# Patient Record
Sex: MALE | Race: WHITE | RURAL
[De-identification: names, ages, dates, MRNs, and addresses within clinical notes are randomized per-mention and may not be internally consistent; named-entity substitution may affect disease eponyms.]

---

## 2017-03-14 RX ORDER — AMLODIPINE AND BENAZEPRIL HYDROCHLORIDE 10; 20 MG/1; MG/1
1 CAPSULE ORAL DAILY
Qty: 90 CAP | Refills: 0 | Status: CANCELLED | OUTPATIENT
Start: 2017-03-14

## 2017-03-14 RX ORDER — SIMVASTATIN 80 MG/1
80 TABLET, FILM COATED ORAL
Qty: 90 TAB | Refills: 3 | Status: CANCELLED | OUTPATIENT
Start: 2017-03-14

## 2017-03-14 NOTE — TELEPHONE ENCOUNTER
Requested Prescriptions     Pending Prescriptions Disp Refills    amLODIPine-benazepril (LOTREL) 10-20 mg per capsule 90 Cap 0     Sig: Take 1 Cap by mouth daily. Appointment needed to refill this medication again.  simvastatin (ZOCOR) 80 mg tablet 90 Tab 3     Sig: Take 1 Tab by mouth nightly.

## 2017-03-15 ENCOUNTER — OFFICE VISIT (OUTPATIENT)
Dept: INTERNAL MEDICINE CLINIC | Age: 67
End: 2017-03-15

## 2017-03-15 VITALS
OXYGEN SATURATION: 95 % | SYSTOLIC BLOOD PRESSURE: 168 MMHG | RESPIRATION RATE: 16 BRPM | WEIGHT: 250 LBS | TEMPERATURE: 97.1 F | HEIGHT: 68 IN | DIASTOLIC BLOOD PRESSURE: 73 MMHG | BODY MASS INDEX: 37.89 KG/M2 | HEART RATE: 69 BPM

## 2017-03-15 DIAGNOSIS — I10 ESSENTIAL HYPERTENSION: Primary | ICD-10-CM

## 2017-03-15 DIAGNOSIS — E08.9 DIABETES MELLITUS DUE TO UNDERLYING CONDITION WITHOUT COMPLICATION, WITHOUT LONG-TERM CURRENT USE OF INSULIN (HCC): ICD-10-CM

## 2017-03-15 DIAGNOSIS — E78.5 HYPERLIPIDEMIA, UNSPECIFIED HYPERLIPIDEMIA TYPE: ICD-10-CM

## 2017-03-15 RX ORDER — AMLODIPINE AND BENAZEPRIL HYDROCHLORIDE 10; 20 MG/1; MG/1
1 CAPSULE ORAL DAILY
Qty: 90 CAP | Refills: 0 | Status: SHIPPED | OUTPATIENT
Start: 2017-03-15

## 2017-03-15 RX ORDER — SIMVASTATIN 80 MG/1
80 TABLET, FILM COATED ORAL
Qty: 90 TAB | Refills: 0 | Status: SHIPPED | OUTPATIENT
Start: 2017-03-15

## 2017-03-15 NOTE — PATIENT INSTRUCTIONS

## 2017-03-15 NOTE — MR AVS SNAPSHOT
Visit Information Date & Time Provider Department Dept. Phone Encounter #  
 3/15/2017 10:30 AM Germania Juan NP [facility] [id number] Your Appointments 4/4/2017 10:15 AM  
ROUTINE CARE with MD Mikhail Gtz ([facility]) Appt Note: medication refill 3/15/17 [address] [facility] [address]  
[phone]  
  
   
 [address] Upcoming Health Maintenance Date Due Hepatitis C Screening 1950 MICROALBUMIN Q1 10/13/1962 EYE EXAM RETINAL OR DILATED Q1 10/13/1962 COLONOSCOPY 10/13/1970 Pneumococcal 19-64 Medium Risk (1 of 1 - PPSV23) 10/13/1971 DTaP/Tdap/Td series (1 - Tdap) 10/13/1973 ZOSTER VACCINE AGE 60> 10/13/2012 HEMOGLOBIN A1C Q6M 3/29/2016 LIPID PANEL Q1 9/29/2016 FOOT EXAM Q1 11/10/2016 Allergies as of 3/15/2017  Review Complete On: 3/15/2017 By: Liberty Cherry LPN No Known Allergies Current Immunizations  Reviewed on 10/9/2012 No immunizations on file. Not reviewed this visit You Were Diagnosed With   
  
 Codes Comments Essential hypertension    -  Primary ICD-10-CM: I10 
ICD-9-CM: 401.9 Hyperlipidemia, unspecified hyperlipidemia type     ICD-10-CM: E78.5 ICD-9-CM: 272.4 Diabetes mellitus due to underlying condition without complication, without long-term current use of insulin (HCC)     ICD-10-CM: E08.9 ICD-9-CM: 249.00 Vitals BP Pulse Temp Resp Height(growth percentile) 168/73 (BP 1 Location: Left arm, BP Patient Position: Sitting) 69 97.1 °F (36.2 °C) (Temporal) 16 5' 8\" (1.727 m) Weight(growth percentile) SpO2 BMI Smoking Status 250 lb (113.4 kg) 95% 38.01 kg/m2 Current Every Day Smoker BMI and BSA Data Body Mass Index Body Surface Area 38.01 kg/m 2 2.33 m 2 Preferred Pharmacy Pharmacy Name Phone 150 [facility], [address] [place] [address] Drive [address] [place] [address] [phone] Your Updated Medication List  
  
   
This list is accurate as of: 3/15/17 11:13 AM.  Always use your most recent med list. amLODIPine-benazepril 10-20 mg per capsule Commonly known as:  Shaun Holster Take 1 Cap by mouth daily. Appointment needed to refill this medication again. metoprolol tartrate 50 mg tablet Commonly known as:  LOPRESSOR Take 1 Tab by mouth two (2) times a day. simvastatin 80 mg tablet Commonly known as:  ZOCOR Take 1 Tab by mouth nightly. Prescriptions Sent to Pharmacy Refills  
 simvastatin (ZOCOR) 80 mg tablet 0 Sig: Take 1 Tab by mouth nightly. Class: Normal  
 Pharmacy: [address] [place] [address], [address] [place] [address] Ph #: [phone] Route: Oral  
 amLODIPine-benazepril (LOTREL) 10-20 mg per capsule 0 Sig: Take 1 Cap by mouth daily. Appointment needed to refill this medication again. Class: Normal  
 Pharmacy: [address] [place] [address] Ph #: [phone] Route: Oral  
  
To-Do List   
 03/15/2017 Lab:  HEMOGLOBIN A1C WITH EAG   
  
 03/15/2017 Lab:  LIPID PANEL   
  
 03/15/2017 Lab:  METABOLIC PANEL, COMPREHENSIVE Patient Instructions High Blood Pressure: Care Instructions Your Care Instructions If your blood pressure is usually above 140/90, you have high blood pressure, or hypertension. That means the top number is 140 or higher or the bottom number is 90 or higher, or both. Despite what a lot of people think, high blood pressure usually doesn't cause headaches or make you feel dizzy or lightheaded. It usually has no symptoms. But it does increase your risk for heart attack, stroke, and kidney or eye damage. The higher your blood pressure, the more your risk increases. Your doctor will give you a goal for your blood pressure.  Your goal will be based on your health and your age. An example of a goal is to keep your blood pressure below 140/90. Lifestyle changes, such as eating healthy and being active, are always important to help lower blood pressure. You might also take medicine to reach your blood pressure goal. 
Follow-up care is a key part of your treatment and safety. Be sure to make and go to all appointments, and call your doctor if you are having problems. It's also a good idea to know your test results and keep a list of the medicines you take. How can you care for yourself at home? Medical treatment · If you stop taking your medicine, your blood pressure will go back up. You may take one or more types of medicine to lower your blood pressure. Be safe with medicines. Take your medicine exactly as prescribed. Call your doctor if you think you are having a problem with your medicine. · Talk to your doctor before you start taking aspirin every day. Aspirin can help certain people lower their risk of a heart attack or stroke. But taking aspirin isn't right for everyone, because it can cause serious bleeding. · See your doctor regularly. You may need to see the doctor more often at first or until your blood pressure comes down. · If you are taking blood pressure medicine, talk to your doctor before you take decongestants or anti-inflammatory medicine, such as ibuprofen. Some of these medicines can raise blood pressure. · Learn how to check your blood pressure at home. Lifestyle changes · Stay at a healthy weight. This is especially important if you put on weight around the waist. Losing even 10 pounds can help you lower your blood pressure. · If your doctor recommends it, get more exercise. Walking is a good choice. Bit by bit, increase the amount you walk every day. Try for at least 30 minutes on most days of the week. You also may want to swim, bike, or do other activities. · Avoid or limit alcohol. Talk to your doctor about whether you can drink any alcohol. · Try to limit how much sodium you eat to less than 2,300 milligrams (mg) a day. Your doctor may ask you to try to eat less than 1,500 mg a day. · Eat plenty of fruits (such as bananas and oranges), vegetables, legumes, whole grains, and low-fat dairy products. · Lower the amount of saturated fat in your diet. Saturated fat is found in animal products such as milk, cheese, and meat. Limiting these foods may help you lose weight and also lower your risk for heart disease. · Do not smoke. Smoking increases your risk for heart attack and stroke. If you need help quitting, talk to your doctor about stop-smoking programs and medicines. These can increase your chances of quitting for good. When should you call for help? Call 911 anytime you think you may need emergency care. This may mean having symptoms that suggest that your blood pressure is causing a serious heart or blood vessel problem. Your blood pressure may be over 180/110. For example, call 911 if: 
· You have symptoms of a heart attack. These may include: ¨ Chest pain or pressure, or a strange feeling in the chest. 
¨ Sweating. ¨ Shortness of breath. ¨ Nausea or vomiting. ¨ Pain, pressure, or a strange feeling in the back, neck, jaw, or upper belly or in one or both shoulders or arms. ¨ Lightheadedness or sudden weakness. ¨ A fast or irregular heartbeat. · You have symptoms of a stroke. These may include: 
¨ Sudden numbness, tingling, weakness, or loss of movement in your face, arm, or leg, especially on only one side of your body. ¨ Sudden vision changes. ¨ Sudden trouble speaking. ¨ Sudden confusion or trouble understanding simple statements. ¨ Sudden problems with walking or balance. ¨ A sudden, severe headache that is different from past headaches. · You have severe back or belly pain. Do not wait until your blood pressure comes down on its own. Get help right away. Call your doctor now or seek immediate care if: 
· Your blood pressure is much higher than normal (such as 180/110 or higher), but you don't have symptoms. · You think high blood pressure is causing symptoms, such as: ¨ Severe headache. ¨ Blurry vision. Watch closely for changes in your health, and be sure to contact your doctor if: 
· Your blood pressure measures 140/90 or higher at least 2 times. That means the top number is 140 or higher or the bottom number is 90 or higher, or both. · You think you may be having side effects from your blood pressure medicine. · Your blood pressure is usually normal, but it goes above normal at least 2 times. Where can you learn more? Go to http://mansoor-edie.info/. Enter M242 in the search box to learn more about \"High Blood Pressure: Care Instructions. \" Current as of: August 8, 2016 Content Version: 11.1 © 7210-9235 [employer]. Care instructions adapted under license by [employer] (which disclaims liability or warranty for this information). If you have questions about a medical condition or this instruction, always ask your healthcare professional. [address] any warranty or liability for your use of this information. Introducing [facility]! Jovani Garzon introduces [employer]amia patient portal. Now you can access parts of your medical record, email your doctor's office, and request medication refills online. 1. In your internet browser, go to [email or web address] 2. Click on the First Time User? Click Here link in the Sign In box. You will see the New Member Sign Up page. 3. Enter your unamia Access Code exactly as it appears below. You will not need to use this code after youve completed the sign-up process.  If you do not sign up before the expiration date, you must request a new code. 
 
· [employer] Access Code: [email or web address] Expires: 6/13/2017 11:11 AM 
 
4. Enter the last four digits of your Social Security Number (xxxx) and Date of Birth (mm/dd/yyyy) as indicated and click Submit. You will be taken to the next sign-up page. 5. Create a [employer] ID. This will be your [employer] login ID and cannot be changed, so think of one that is secure and easy to remember. 6. Create a [employer] password. You can change your password at any time. 7. Enter your Password Reset Question and Answer. This can be used at a later time if you forget your password. 8. Enter your e-mail address. You will receive e-mail notification when new information is available in [address] 9. Click Sign Up. You can now view and download portions of your medical record. 10. Click the Download Summary menu link to download a portable copy of your medical information. If you have questions, please visit the Frequently Asked Questions section of the [employer] website. Remember, [employer] is NOT to be used for urgent needs. For medical emergencies, dial 911. Now available from your iPhone and Android! Please provide this summary of care documentation to your next provider. Your primary care clinician is listed as Linette Leonard. If you have any questions after today's visit, please call [phone].

## 2017-03-17 NOTE — PROGRESS NOTES
HISTORY OF PRESENT ILLNESS  Dinesh Wells is a 61 y.o. male. HPI  Chief Complaint   Patient presents with    Hypertension     med refills     States is concerned that he had to come in for medication refill. Advised patient he had not been seen since November 2015 and also needed labs for therapeutic monitoring. Advised patient it was written on his las refill bottle appointment required for next refill. Review of Systems   Constitutional: Negative. HENT: Negative. Eyes: Negative. Respiratory: Negative. Cardiovascular: Negative. Gastrointestinal: Negative. Negative for abdominal pain. Physical Exam   Constitutional: She is oriented to person, place, and time. Appears well-developed and well-nourished. No distress. HENT:   Head: Normocephalic and atraumatic. Eyes: Conjunctivae are normal.   Cardiovascular: Normal rate, regular rhythm, normal heart sounds and intact distal pulses. Exam reveals no gallop and no friction rub. No murmur heard. Pulmonary/Chest: Effort normal and breath sounds normal. No respiratory distress. No wheezes, rales. Abdominal: Soft. exhibits no distension. Musculoskeletal: Normal range of motion. Neurological: She is alert and oriented to person, place, and time. Skin: Warm and dry. Not diaphoretic. Nursing note and vitals reviewed. Plan of care and AVS reviewed with patient who verbalized understanding. ASSESSMENT and PLAN    ICD-10-CM ICD-9-CM    1. Essential hypertension I10 401.9 amLODIPine-benazepril (LOTREL) 10-20 mg per capsule      METABOLIC PANEL, COMPREHENSIVE   2. Hyperlipidemia, unspecified hyperlipidemia type E78.5 272.4 simvastatin (ZOCOR) 80 mg tablet      LIPID PANEL   3. Diabetes mellitus due to underlying condition without complication, without long-term current use of insulin ([facility]) E08.9 249.00 HEMOGLOBIN A1C WITH EAG   Medications refilled  Patient to get above labs before F/U appointment with Dr. Shari Urbina in April.